# Patient Record
(demographics unavailable — no encounter records)

---

## 2020-03-10 NOTE — NUR
1540 DRESSED, AWAKE & ALERT.  RECEIVED D/C INFORMATION EARLIER FROM
ABISAI SCHRADER R.N..  PT & WIFE STATE THEY UNDERSTAND D/C INSTRUCTIONS.
TO PRIVATE CAR PER WHEELCHAIR.  HOME WITH [facility].  CASSIDY HASSAN R.N.

## 2020-03-10 NOTE — NUR
1140 PT RESTING WELL. MILD HIGH B/P. TOLERATING WATER. NO SWELLING OR
BLEEDING NOTED. DENIES PAIN. COLOR GOOD. ORDERS NOTED AND TRAY
ORDERED FROM DIETARY